# Patient Record
Sex: FEMALE | Race: WHITE | Employment: FULL TIME | URBAN - METROPOLITAN AREA
[De-identification: names, ages, dates, MRNs, and addresses within clinical notes are randomized per-mention and may not be internally consistent; named-entity substitution may affect disease eponyms.]

---

## 2020-01-23 ENCOUNTER — THERAPY VISIT (OUTPATIENT)
Dept: PHYSICAL THERAPY | Facility: CLINIC | Age: 55
End: 2020-01-23
Payer: COMMERCIAL

## 2020-01-23 DIAGNOSIS — Z47.89 AFTERCARE FOLLOWING SURGERY OF THE MUSCULOSKELETAL SYSTEM: ICD-10-CM

## 2020-01-23 PROCEDURE — 97161 PT EVAL LOW COMPLEX 20 MIN: CPT | Mod: GP | Performed by: PHYSICAL THERAPIST

## 2020-01-23 PROCEDURE — 97110 THERAPEUTIC EXERCISES: CPT | Mod: GP | Performed by: PHYSICAL THERAPIST

## 2020-01-23 ASSESSMENT — ACTIVITIES OF DAILY LIVING (ADL)
TOTAL_ADL_ITEM_SCORE:: 73
HIGHEST_POTENTIAL_ADL_SCORE:: 84
ACTIVITIES_OF_DAILY_LIVING_MEASURE_SCORE(%):: 86.9

## 2020-01-23 NOTE — LETTER
[facility] ATHLETIC [facility] PHYSICAL [facility]  [address]  [facility] [address]  [phone]    2020    Re: Mitali Cristobal   :   1965  MRN:  [id number]   REFERRING PHYSICIAN:   Sendy Finch    [facility] ATHLETIC [facility]    Date of Initial Evaluation: 20  Visits:  Rxs Used: 1  Reason for Referral:  Aftercare following surgery of the musculoskeletal system    EVALUATION SUMMARY    [facility]  Subjective:  The history is provided by the patient. No  was used.   Mitali Cristobal being seen for B Bunionectomy.   Problem began 2019. Problem occurred: Surgery, post operative  and reported as 2/10 on pain scale. General health as reported by patient is good. Health conditions: high BP.  Medical allergies: none.  Surgeries include:  Orthopedic surgery.  Current medications: high BP.   Primary job tasks include:  Computer work, repetitive tasks and prolonged standing.  Pain is described as aching (medial aspect of the foot. pt reports her pain is worse with ambulating and weight bearing, nothing at rest.) and is intermittent.  Since onset symptoms are rapidly improving.   There was significant (Pt reports the surgery helped her pain significantly in her feet, is very happy with how she feels post operatively so far) improvement following previous treatment.   Patient is . Restrictions include:  Working in normal job with restrictions.    Barriers include:  None as reported by patient.  Red flags:  None as reported by patient.                   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 Pt would also like less pain and sensitivity in the toes with more standing. Pt would like to be able to also do her upright bike with resistance at the gym as she was able to do before  "surgery.    \"Jane\"  Objective:    Ankle/Foot Evaluation  ROM:    AROM:    Dorsiflexion:  Left:   10  Right:   10  Plantarflexion:  Left:  60    Right:  60  Inversion:  Left:  30     Right:  30  Eversion:  10     Right:  10  Great toe flexion:  Left:  10     Right:  10  Great Toe Extension:  Left:  20     Right: 20  PROM:    Pain: no pain with ankle or great toe ROM.    Strength:    Dorsiflexion:  Left: 4+/5   Strong/pain free    Pain:   Right: 4+/5   Strong/pain free  Pain:  Plantarflexion: Left: 2-/5   Strong/pain free  Pain:   Right: 2-/5  Strong/pain free  Pain:  Inversion:Left: 3+/5  Strong/pain free  Pain:     Right: 3+/5  Strong/pain free  Pain:  Eversion:Left: 4+/5  Strong/pain free  Pain:  Right: 4+/5  Strong/pain free  Pain:  Flexion Great Toe:Left: 3+/5  Strong/pain free  Pain:  Right: 3+/5   Strong/pain free  Pain:  Extension Great Toe:Left: 3+/5  Strong/pain free  Pain:  Right: 3+/5  Strong/pain free  Pain:    PALPATION: Palpation of ankle: no pain with palpation of surgical scar.     Assessment/Plan:    Patient is a 54 year old female with both sides Foot complaints.    Patient has the following significant findings with corresponding treatment plan.                Diagnosis 1:  B Post op Bunionectomy  Pain -  manual therapy, self management, education and home program  Decreased ROM/flexibility - manual therapy and therapeutic exercise  Decreased strength - therapeutic exercise and therapeutic activities, neuromuscular reeducation    Previous and current functional limitations:  (See Goal Flow Sheet for this information)    Short term and Long term goals: (See Goal Flow Sheet for this information)     Communication ability:  Patient appears to be able to clearly communicate and understand verbal and written communication and follow directions correctly.  Treatment Explanation - The following has been discussed with the patient:   RX ordered/plan of care  Anticipated outcomes  Possible risks and side " effects  This patient would benefit from PT intervention to resume normal activities.   Rehab potential is good.    Frequency:  1 X week, once daily  Duration:  for 6 weeks  Discharge Plan:  Achieve all LTG.  Independent in home treatment program.  Reach maximal therapeutic benefit.      Thank you for your referral.    INQUIRIES  Therapist: Selena Lui DPT  [facility] PHYSICAL THERAPY  [address] [place], [place] [address]  [facility] [address]  Phone: [phone]  Fax: [phone]

## 2020-01-23 NOTE — PROGRESS NOTES
"[facility] Initial Evaluation  Subjective:  The history is provided by the patient. No  was used.   Mitali Cristobal being seen for B Bunionectomy.   Problem began 12/11/2019. Problem occurred: Surgery, post operative  and reported as 2/10 on pain scale. General health as reported by patient is good. Health conditions: high BP.  Medical allergies: none.  Surgeries include:  Orthopedic surgery.  Current medications: high BP.   Primary job tasks include:  Computer work, repetitive tasks and prolonged standing.  Pain is described as aching (medial aspect of the foot. pt reports her pain is worse with ambulating and weight bearing, nothing at rest.) and is intermittent.  Since onset symptoms are rapidly improving.   There was significant (Pt reports the surgery helped her pain significantly in her feet, is very happy with how she feels post operatively so far) improvement following previous treatment.   Patient is . Restrictions include:  Working in normal job with restrictions.    Barriers include:  None as reported by patient.  Red flags:  None as reported by patient.                     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 Pt would also like less pain and sensitivity in the toes with more standing. Pt would like to be able to also do her upright bike with resistance at the gym as she was able to do before surgery.    \"Jane\"  Objective:  System    Ankle/Foot Evaluation  ROM:    AROM:    Dorsiflexion:  Left:   10  Right:   10  Plantarflexion:  Left:  60    Right:  60  Inversion:  Left:  30     Right:  30  Eversion:  10     Right:  10  Great toe flexion:  Left:  10     Right:  10  Great Toe Extension:  Left:  20     Right: 20  PROM:                Pain: no pain with ankle or great toe ROM.    Strength:    Dorsiflexion:  Left: 4+/5   Strong/pain free    Pain:   Right: 4+/5   Strong/pain free  " Pain:  Plantarflexion: Left: 2-/5   Strong/pain free  Pain:   Right: 2-/5  Strong/pain free  Pain:  Inversion:Left: 3+/5  Strong/pain free  Pain:     Right: 3+/5  Strong/pain free  Pain:  Eversion:Left: 4+/5  Strong/pain free  Pain:  Right: 4+/5  Strong/pain free  Pain:  Flexion Great Toe:Left: 3+/5  Strong/pain free  Pain:  Right: 3+/5   Strong/pain free  Pain:  Extension Great Toe:Left: 3+/5  Strong/pain free  Pain:  Right: 3+/5  Strong/pain free  Pain:                  PALPATION: Palpation of ankle: no pain with palpation of surgical scar.                                                          General     ROS    Assessment/Plan:    Patient is a 54 year old female with both sides Foot complaints.    Patient has the following significant findings with corresponding treatment plan.                Diagnosis 1:  B Post op Bunionectomy  Pain -  manual therapy, self management, education and home program  Decreased ROM/flexibility - manual therapy and therapeutic exercise  Decreased strength - therapeutic exercise and therapeutic activities, neuromuscular reeducation    Previous and current functional limitations:  (See Goal Flow Sheet for this information)    Short term and Long term goals: (See Goal Flow Sheet for this information)     Communication ability:  Patient appears to be able to clearly communicate and understand verbal and written communication and follow directions correctly.  Treatment Explanation - The following has been discussed with the patient:   RX ordered/plan of care  Anticipated outcomes  Possible risks and side effects  This patient would benefit from PT intervention to resume normal activities.   Rehab potential is good.    Frequency:  1 X week, once daily  Duration:  for 6 weeks  Discharge Plan:  Achieve all LTG.  Independent in home treatment program.  Reach maximal therapeutic benefit.    Please refer to the daily flowsheet for treatment today, total treatment time and time spent  performing 1:1 timed codes.

## 2020-02-03 ENCOUNTER — THERAPY VISIT (OUTPATIENT)
Dept: PHYSICAL THERAPY | Facility: CLINIC | Age: 55
End: 2020-02-03
Payer: COMMERCIAL

## 2020-02-03 DIAGNOSIS — Z47.89 AFTERCARE FOLLOWING SURGERY OF THE MUSCULOSKELETAL SYSTEM: ICD-10-CM

## 2020-02-03 PROCEDURE — 97110 THERAPEUTIC EXERCISES: CPT | Mod: GP | Performed by: PHYSICAL THERAPIST

## 2020-02-03 PROCEDURE — 97140 MANUAL THERAPY 1/> REGIONS: CPT | Mod: GP | Performed by: PHYSICAL THERAPIST

## 2020-02-10 ENCOUNTER — THERAPY VISIT (OUTPATIENT)
Dept: PHYSICAL THERAPY | Facility: CLINIC | Age: 55
End: 2020-02-10
Payer: COMMERCIAL

## 2020-02-10 DIAGNOSIS — Z47.89 AFTERCARE FOLLOWING SURGERY OF THE MUSCULOSKELETAL SYSTEM: ICD-10-CM

## 2020-02-10 PROCEDURE — 97112 NEUROMUSCULAR REEDUCATION: CPT | Mod: GP | Performed by: PHYSICAL THERAPIST

## 2020-02-10 PROCEDURE — 97110 THERAPEUTIC EXERCISES: CPT | Mod: GP | Performed by: PHYSICAL THERAPIST

## 2020-02-10 ASSESSMENT — ACTIVITIES OF DAILY LIVING (ADL)
ACTIVITIES_OF_DAILY_LIVING_MEASURE_SCORE(%):: 97.62
HIGHEST_POTENTIAL_ADL_SCORE:: 84
TOTAL_ADL_ITEM_SCORE:: 82

## 2020-04-07 ENCOUNTER — TELEPHONE (OUTPATIENT)
Dept: PHYSICAL THERAPY | Facility: CLINIC | Age: 55
End: 2020-04-07

## 2020-04-14 ENCOUNTER — TELEPHONE (OUTPATIENT)
Dept: PHYSICAL THERAPY | Facility: CLINIC | Age: 55
End: 2020-04-14

## 2020-04-29 ENCOUNTER — TELEPHONE (OUTPATIENT)
Dept: PHYSICAL THERAPY | Facility: CLINIC | Age: 55
End: 2020-04-29

## 2020-04-29 PROBLEM — Z47.89 AFTERCARE FOLLOWING SURGERY OF THE MUSCULOSKELETAL SYSTEM: Status: RESOLVED | Noted: 2020-01-23 | Resolved: 2020-04-29

## 2020-04-29 NOTE — PROGRESS NOTES
Discharge Note    Progress reporting period is from initial evaluation date (please see noted date below) to Feb 10, 2020.  Linked Episodes   Type: Episode: Status: Noted: Resolved: Last update: Updated by:   PHYSICAL THERAPY Post op B Bunionectomy 1/23/2020 Active 1/23/2020  2/10/2020  3:10 PM Selena Lui, PT      Comments:       Mitali failed to follow up and current status is unknown.  Please see information below for last relevant information on current status.  Patient seen for 3 visits.    SUBJECTIVE  Subjective changes noted by patient:  Pt reports the ankles are doing well, mild pain on the ball of the R foot, otherwise pain free. HEP is going ok, no pain or issues.   .  Current pain level is 0/10.     Previous pain level was  2/10.   Changes in function:  Yes (See Goal flowsheet attached for changes in current functional level)  Adverse reaction to treatment or activity: None    OBJECTIVE  Changes noted in objective findings: No issues getting around at work, no issues at work. AROM DF R to 25, L to 20 degrees. Remainder of ROM is full. MMT DF 4+, PF 4+, IV and EV 4 to 4+/5. Noted weakness GM and hip ABD, started strengthening for gait. ABD at 4-/5, gM 4/5. Gait lacking reciprocal arm swing and trunk rotation.      ASSESSMENT/PLAN  Diagnosis: Post op B Bunionectomy   Updated problem list and treatment plan:   Decreased strength - HEP  Impaired balance - HEP  STG/LTGs have been met or progress has been made towards goals:  Yes, please see goal flowsheet for most current information  Assessment of Progress: current status is unknown.    Last current status: Pt is progressing as expected   Self Management Plans:  HEP  I have re-evaluated this patient and find that the nature, scope, duration and intensity of the therapy is appropriate for the medical condition of the patient.  Mitali continues to require the following intervention to meet STG and LTG's:  HEP.    Recommendations:  Discharge with current  home program.  Patient to follow up with MD as needed.    Please refer to the daily flowsheet for treatment today, total treatment time and time spent performing 1:1 timed codes.    [place],PT, DPT, OCS

## 2023-01-19 ENCOUNTER — DOCUMENTATION ONLY (OUTPATIENT)
Dept: OTHER | Facility: CLINIC | Age: 58
End: 2023-01-19
Payer: COMMERCIAL

## 2025-01-07 ENCOUNTER — APPOINTMENT (OUTPATIENT)
Dept: CT IMAGING | Facility: CLINIC | Age: 60
End: 2025-01-07
Attending: NURSE PRACTITIONER
Payer: COMMERCIAL

## 2025-01-07 ENCOUNTER — HOSPITAL ENCOUNTER (EMERGENCY)
Facility: CLINIC | Age: 60
Discharge: HOME | End: 2025-01-07
Attending: NURSE PRACTITIONER
Payer: COMMERCIAL

## 2025-01-07 VITALS
HEART RATE: 66 BPM | BODY MASS INDEX: 35.26 KG/M2 | TEMPERATURE: 97.7 F | RESPIRATION RATE: 18 BRPM | SYSTOLIC BLOOD PRESSURE: 142 MMHG | OXYGEN SATURATION: 99 % | WEIGHT: 199 LBS | DIASTOLIC BLOOD PRESSURE: 98 MMHG | HEIGHT: 63 IN

## 2025-01-07 DIAGNOSIS — R91.1 PULMONARY NODULE, RIGHT: ICD-10-CM

## 2025-01-07 DIAGNOSIS — R14.0 BLOATING: ICD-10-CM

## 2025-01-07 LAB
ALBUMIN SERPL BCG-MCNC: 4.4 G/DL (ref 3.5–5.2)
ALBUMIN UR-MCNC: NEGATIVE MG/DL
ALP SERPL-CCNC: 64 U/L (ref 40–150)
ALT SERPL W P-5'-P-CCNC: 23 U/L (ref 0–50)
ANION GAP SERPL CALCULATED.3IONS-SCNC: 11 MMOL/L (ref 7–15)
APPEARANCE UR: CLEAR
AST SERPL W P-5'-P-CCNC: 17 U/L (ref 0–45)
BACTERIA #/AREA URNS HPF: ABNORMAL /HPF
BASOPHILS # BLD AUTO: 0 10E3/UL (ref 0–0.2)
BASOPHILS NFR BLD AUTO: 1 %
BILIRUB SERPL-MCNC: 0.2 MG/DL
BILIRUB UR QL STRIP: NEGATIVE
BUN SERPL-MCNC: 13.2 MG/DL (ref 8–23)
CALCIUM SERPL-MCNC: 9.2 MG/DL (ref 8.8–10.4)
CHLORIDE SERPL-SCNC: 104 MMOL/L (ref 98–107)
COLOR UR AUTO: ABNORMAL
CREAT SERPL-MCNC: 0.64 MG/DL (ref 0.51–0.95)
EGFRCR SERPLBLD CKD-EPI 2021: >90 ML/MIN/1.73M2
EOSINOPHIL # BLD AUTO: 0.1 10E3/UL (ref 0–0.7)
EOSINOPHIL NFR BLD AUTO: 2 %
ERYTHROCYTE [DISTWIDTH] IN BLOOD BY AUTOMATED COUNT: 12.5 % (ref 10–15)
GLUCOSE SERPL-MCNC: 90 MG/DL (ref 70–99)
GLUCOSE UR STRIP-MCNC: NEGATIVE MG/DL
HCO3 SERPL-SCNC: 25 MMOL/L (ref 22–29)
HCT VFR BLD AUTO: 40.1 % (ref 35–47)
HGB BLD-MCNC: 13.7 G/DL (ref 11.7–15.7)
HGB UR QL STRIP: NEGATIVE
IMM GRANULOCYTES # BLD: 0 10E3/UL
IMM GRANULOCYTES NFR BLD: 0 %
KETONES UR STRIP-MCNC: NEGATIVE MG/DL
LEUKOCYTE ESTERASE UR QL STRIP: ABNORMAL
LIPASE SERPL-CCNC: 33 U/L (ref 13–60)
LYMPHOCYTES # BLD AUTO: 1.8 10E3/UL (ref 0.8–5.3)
LYMPHOCYTES NFR BLD AUTO: 33 %
MCH RBC QN AUTO: 31.4 PG (ref 26.5–33)
MCHC RBC AUTO-ENTMCNC: 34.2 G/DL (ref 31.5–36.5)
MCV RBC AUTO: 92 FL (ref 78–100)
MONOCYTES # BLD AUTO: 0.4 10E3/UL (ref 0–1.3)
MONOCYTES NFR BLD AUTO: 7 %
NEUTROPHILS # BLD AUTO: 3.1 10E3/UL (ref 1.6–8.3)
NEUTROPHILS NFR BLD AUTO: 57 %
NITRATE UR QL: NEGATIVE
NRBC # BLD AUTO: 0 10E3/UL
NRBC BLD AUTO-RTO: 0 /100
NT-PROBNP SERPL-MCNC: 170 PG/ML (ref 0–900)
PH UR STRIP: 7 [PH] (ref 5–7)
PLATELET # BLD AUTO: 175 10E3/UL (ref 150–450)
POTASSIUM SERPL-SCNC: 4.1 MMOL/L (ref 3.4–5.3)
PROT SERPL-MCNC: 7.3 G/DL (ref 6.4–8.3)
RBC # BLD AUTO: 4.36 10E6/UL (ref 3.8–5.2)
RBC URINE: 2 /HPF
SODIUM SERPL-SCNC: 140 MMOL/L (ref 135–145)
SP GR UR STRIP: 1.03 (ref 1–1.03)
SQUAMOUS EPITHELIAL: 11 /HPF
TSH SERPL DL<=0.005 MIU/L-ACNC: 1.83 UIU/ML (ref 0.3–4.2)
UROBILINOGEN UR STRIP-MCNC: NORMAL MG/DL
WBC # BLD AUTO: 5.4 10E3/UL (ref 4–11)
WBC URINE: 7 /HPF

## 2025-01-07 PROCEDURE — 84443 ASSAY THYROID STIM HORMONE: CPT | Performed by: NURSE PRACTITIONER

## 2025-01-07 PROCEDURE — 82435 ASSAY OF BLOOD CHLORIDE: CPT | Performed by: NURSE PRACTITIONER

## 2025-01-07 PROCEDURE — 83880 ASSAY OF NATRIURETIC PEPTIDE: CPT | Performed by: NURSE PRACTITIONER

## 2025-01-07 PROCEDURE — 85004 AUTOMATED DIFF WBC COUNT: CPT | Performed by: NURSE PRACTITIONER

## 2025-01-07 PROCEDURE — 99285 EMERGENCY DEPT VISIT HI MDM: CPT | Mod: 25 | Performed by: NURSE PRACTITIONER

## 2025-01-07 PROCEDURE — 36415 COLL VENOUS BLD VENIPUNCTURE: CPT | Performed by: NURSE PRACTITIONER

## 2025-01-07 PROCEDURE — 99284 EMERGENCY DEPT VISIT MOD MDM: CPT | Performed by: NURSE PRACTITIONER

## 2025-01-07 PROCEDURE — 84155 ASSAY OF PROTEIN SERUM: CPT | Performed by: NURSE PRACTITIONER

## 2025-01-07 PROCEDURE — 87086 URINE CULTURE/COLONY COUNT: CPT | Performed by: NURSE PRACTITIONER

## 2025-01-07 PROCEDURE — 250N000009 HC RX 250: Performed by: NURSE PRACTITIONER

## 2025-01-07 PROCEDURE — 74177 CT ABD & PELVIS W/CONTRAST: CPT

## 2025-01-07 PROCEDURE — 83690 ASSAY OF LIPASE: CPT | Performed by: NURSE PRACTITIONER

## 2025-01-07 PROCEDURE — 81003 URINALYSIS AUTO W/O SCOPE: CPT | Performed by: NURSE PRACTITIONER

## 2025-01-07 PROCEDURE — 84075 ASSAY ALKALINE PHOSPHATASE: CPT | Performed by: NURSE PRACTITIONER

## 2025-01-07 PROCEDURE — 250N000011 HC RX IP 250 OP 636: Performed by: NURSE PRACTITIONER

## 2025-01-07 RX ORDER — IOPAMIDOL 755 MG/ML
500 INJECTION, SOLUTION INTRAVASCULAR ONCE
Status: COMPLETED | OUTPATIENT
Start: 2025-01-07 | End: 2025-01-07

## 2025-01-07 RX ADMIN — SODIUM CHLORIDE 60 ML: 9 INJECTION, SOLUTION INTRAVENOUS at 15:49

## 2025-01-07 RX ADMIN — IOPAMIDOL 95 ML: 755 INJECTION, SOLUTION INTRAVENOUS at 15:49

## 2025-01-07 ASSESSMENT — ACTIVITIES OF DAILY LIVING (ADL)
ADLS_ACUITY_SCORE: 41

## 2025-01-07 ASSESSMENT — COLUMBIA-SUICIDE SEVERITY RATING SCALE - C-SSRS
6. HAVE YOU EVER DONE ANYTHING, STARTED TO DO ANYTHING, OR PREPARED TO DO ANYTHING TO END YOUR LIFE?: NO
2. HAVE YOU ACTUALLY HAD ANY THOUGHTS OF KILLING YOURSELF IN THE PAST MONTH?: NO
1. IN THE PAST MONTH, HAVE YOU WISHED YOU WERE DEAD OR WISHED YOU COULD GO TO SLEEP AND NOT WAKE UP?: NO

## 2025-01-07 NOTE — ED PROVIDER NOTES
"  History     Chief Complaint   Patient presents with    Abdominal Pain     HPI  Mitali Cristobal is a 59 year old female with history of hypertension and hyperlipidemia who presents to the emergency department for complaint of abdominal bloating.  Patient states she has been feeling upper abdominal bloating for the last 2 to 3 months.  She states it has been gradually worsening.  When she is walking she feels like there is pressure pushing up on her which makes her a little bit short of breath.  She denies any abdominal pain.  Denies frequent burping.  Denies constipation or diarrhea.  Denies urinary symptoms.  Denies nausea or vomiting.  She has been able to eat and drink normally.  Denies any recent fevers, chills, or illness.  She had a ED visit today and was recommended to be seen in the emergency department.    She has not been evaluated prior to this.  No prior history of abdominal surgeries.  Home medications include propranolol and amlodipine for her hypertension.    Allergies:  No Known Allergies    Problem List:    There are no active problems to display for this patient.       Past Medical History:    No past medical history on file.    Past Surgical History:    No past surgical history on file.    Family History:    No family history on file.    Social History:  Marital Status:   [2]  Social History     Tobacco Use    Smoking status: Never   Substance Use Topics    Alcohol use: No    Drug use: No        Medications:    ferrous sulfate 325 (65 FE) MG tablet  norgestrel-ethinyl estradiol (LO/OVRAL) 0.3-30 MG-MCG per tablet          Review of Systems  As mentioned above in the history present illness. All other systems were reviewed and are negative.    Physical Exam   BP: (!) 163/110  Pulse: 60  Temp: 97.7  F (36.5  C)  Resp: 20  Height: 160 cm (5' 3\")  Weight: 90.3 kg (199 lb)  SpO2: 99 %      Physical Exam  Constitutional:       General: She is not in acute distress.     Appearance: Normal " appearance. She is obese. She is not ill-appearing.   HENT:      Head: Normocephalic and atraumatic.      Right Ear: External ear normal.      Left Ear: External ear normal.      Nose: Nose normal.      Mouth/Throat:      Mouth: Mucous membranes are moist.   Eyes:      Conjunctiva/sclera: Conjunctivae normal.   Cardiovascular:      Rate and Rhythm: Normal rate and regular rhythm.      Heart sounds: Normal heart sounds. No murmur heard.  Pulmonary:      Effort: Pulmonary effort is normal. No respiratory distress.      Breath sounds: Normal breath sounds.   Abdominal:      General: Bowel sounds are normal. There is no distension.      Palpations: Abdomen is soft.      Tenderness: There is no abdominal tenderness.   Musculoskeletal:         General: Normal range of motion.   Skin:     General: Skin is warm and dry.      Findings: No rash.   Neurological:      General: No focal deficit present.      Mental Status: She is alert and oriented to person, place, and time.         ED Course        Procedures         Results for orders placed or performed during the hospital encounter of 01/07/25 (from the past 24 hours)   CBC with platelets differential    Narrative    The following orders were created for panel order CBC with platelets differential.  Procedure                               Abnormality         Status                     ---------                               -----------         ------                     CBC with platelets and d...[[id number]]                      Final result                 Please view results for these tests on the individual orders.   Lipase   Result Value Ref Range    Lipase 33 13 - 60 U/L   Comprehensive metabolic panel   Result Value Ref Range    Sodium 140 135 - 145 mmol/L    Potassium 4.1 3.4 - 5.3 mmol/L    Carbon Dioxide (CO2) 25 22 - 29 mmol/L    Anion Gap 11 7 - 15 mmol/L    Urea Nitrogen 13.2 8.0 - 23.0 mg/dL    Creatinine 0.64 0.51 - 0.95 mg/dL    GFR Estimate >90 >60  mL/min/1.73m2    Calcium 9.2 8.8 - 10.4 mg/dL    Chloride 104 98 - 107 mmol/L    Glucose 90 70 - 99 mg/dL    Alkaline Phosphatase 64 40 - 150 U/L    AST 17 0 - 45 U/L    ALT 23 0 - 50 U/L    Protein Total 7.3 6.4 - 8.3 g/dL    Albumin 4.4 3.5 - 5.2 g/dL    Bilirubin Total 0.2 <=1.2 mg/dL   TSH with free T4 reflex   Result Value Ref Range    TSH 1.83 0.30 - 4.20 uIU/mL   Nt probnp inpatient (BNP)   Result Value Ref Range    N terminal Pro BNP Inpatient 170 0 - 900 pg/mL   CBC with platelets and differential   Result Value Ref Range    WBC Count 5.4 4.0 - 11.0 10e3/uL    RBC Count 4.36 3.80 - 5.20 10e6/uL    Hemoglobin 13.7 11.7 - 15.7 g/dL    Hematocrit 40.1 35.0 - 47.0 %    MCV 92 78 - 100 fL    MCH 31.4 26.5 - 33.0 pg    MCHC 34.2 31.5 - 36.5 g/dL    RDW 12.5 10.0 - 15.0 %    Platelet Count 175 150 - 450 10e3/uL    % Neutrophils 57 %    % Lymphocytes 33 %    % Monocytes 7 %    % Eosinophils 2 %    % Basophils 1 %    % Immature Granulocytes 0 %    NRBCs per 100 WBC 0 <1 /100    Absolute Neutrophils 3.1 1.6 - 8.3 10e3/uL    Absolute Lymphocytes 1.8 0.8 - 5.3 10e3/uL    Absolute Monocytes 0.4 0.0 - 1.3 10e3/uL    Absolute Eosinophils 0.1 0.0 - 0.7 10e3/uL    Absolute Basophils 0.0 0.0 - 0.2 10e3/uL    Absolute Immature Granulocytes 0.0 <=0.4 10e3/uL    Absolute NRBCs 0.0 10e3/uL   CT Abdomen Pelvis w Contrast    Narrative    EXAM: CT ABDOMEN PELVIS W CONTRAST  LOC[facility]  DATE: 1/7/2025    INDICATION: Feels abdominal bloating.  COMPARISON: None.  TECHNIQUE: CT scan of the abdomen and pelvis was performed following injection of IV contrast. Multiplanar reformats were obtained. Dose reduction techniques were used.  CONTRAST: Isovue 370, 97mL.    FINDINGS:   LOWER CHEST: Calcified granuloma the left lung base. Cluster of nodularity at the right middle lobe suggested with one of the nodules measuring up to 6 mm series 3 image 9.    HEPATOBILIARY: No acute abnormality. Gallbladder  shows no calcified gallstones. Mild contraction at the gallbladder fundus.    PANCREAS: Normal.    SPLEEN: Normal.    ADRENAL GLANDS: Normal.    KIDNEYS/BLADDER: No hydronephrosis or urolithiasis. Tiny cyst at the right kidney is suggested on series 3 image 123, without specific imaging follow-up recommended. The bladder is unremarkable.    BOWEL: Normal appendix. No acute inflammatory change of the bowel. No bowel obstruction identified. Moderate stool within the colon. No free air or free fluid.    LYMPH NODES: No adenopathy or nodularity identified.    VASCULATURE: Negative vascular calcifications.    PELVIC ORGANS: Heterogeneous appearance of the uterus suggestive of underlying fibroids. An example is 2.2 cm on series 3 image 193. No adnexal abnormality identified.    MUSCULOSKELETAL: Mild degenerative changes of the spine.      Impression    IMPRESSION:   1.  No acute abnormality identified.  2.  Uterine fibroid suggested.  3.  Cluster of pulmonary nodules at the right lung are indeterminant. See below for follow-up imaging guidelines.    Recommendations for an incidental lung nodule = or > 6mm to 8mm:  Low-Risk Patient: Initial follow-up CT at 6-12 months, then consider CT at 18-24 months if no change.  High-Risk Patient: Initial follow-up CT at 6-12 months, then CT at 18-24 months if no change.    *Low Risk: Minimal or absent history of smoking or other known risk factors.  *Nonsolid (ground-glass) or partly solid nodules may require longer follow-up to exclude indolent adenocarcinoma.  1.  *Recommendations based on Guidelines for the Management of Incidental Pulmonary Nodules Detected at CT: From the Fleischner Society 2017, Radiology 2017.   UA with Microscopic reflex to Culture    Specimen: Urine, Midstream   Result Value Ref Range    Color Urine Straw Colorless, Straw, Light Yellow, Yellow    Appearance Urine Clear Clear    Glucose Urine Negative Negative mg/dL    Bilirubin Urine Negative Negative     Ketones Urine Negative Negative mg/dL    Specific Gravity Urine 1.035 1.003 - 1.035    Blood Urine Negative Negative    pH Urine 7.0 5.0 - 7.0    Protein Albumin Urine Negative Negative mg/dL    Urobilinogen Urine Normal Normal, 2.0 mg/dL    Nitrite Urine Negative Negative    Leukocyte Esterase Urine Moderate (A) Negative    Bacteria Urine Few (A) None Seen /HPF    RBC Urine 2 <=2 /HPF    WBC Urine 7 (H) <=5 /HPF    Squamous Epithelials Urine 11 (H) <=1 /HPF    Narrative    Urine Culture ordered based on laboratory criteria       Medications   iopamidol (ISOVUE-370) solution 500 mL (95 mLs Intravenous $Given 1/7/25 2356)   sodium chloride 0.9 % bag 100mL for CT scan flush use (60 mLs Intravenous $Given 1/7/25 4869)       Assessments & Plan (with Medical Decision Making)     59 year old female with complaint of feeling upper abdominal bloating for a couple monhts. No abdominal pain.   Abdominal exam without peritoneal signs. No evidence of acute abdomen at this time. Well appearing.   Abdominal/pelvis CT reveals no acute pathology to explain her feeling of abdominal bloating.  Specifically normal-appearing gallbladder.  No evidence of gallstones.  Normal pancreas.  No evidence of pancreatitis.  No hydronephrosis or ureteral stone.  Normal-appearing appendix.  No evidence of bowel obstruction.  She does have a moderate amount of stool within the colon.  Incidental findings include possible uterine fibroid.  There is a cluster of pulmonary nodules at the right lung base which are indeterminate.  She is a former smoker, quit more than 20 years ago.  No concerning lab findings.  Normal urinalysis.  I discussed the above with patient including the incidental findings of pulmonary nodules.  She was instructed to make an appointment for recheck with her clinic provider.        Discharge Medication List as of 1/7/2025  4:50 PM          Final diagnoses:   Bloating   Pulmonary nodule, right       1/7/2025   [facility]  [facility] EMERGENCY DEPT       Caridad, NAZARIO Redd CNP  01/07/25 1126

## 2025-01-07 NOTE — DISCHARGE INSTRUCTIONS
No concerning findings to explain your bloating.  Moderate amount of stool throughout colon.  We discussed the incidental findings on you CT.  Make appointment for recheck with your clinic provider.

## 2025-01-09 LAB — BACTERIA UR CULT: NORMAL

## 2025-01-11 ENCOUNTER — HEALTH MAINTENANCE LETTER (OUTPATIENT)
Age: 60
End: 2025-01-11